# Patient Record
Sex: FEMALE | URBAN - METROPOLITAN AREA
[De-identification: names, ages, dates, MRNs, and addresses within clinical notes are randomized per-mention and may not be internally consistent; named-entity substitution may affect disease eponyms.]

---

## 2017-04-20 ENCOUNTER — APPOINTMENT (RX ONLY)
Age: 32
Setting detail: DERMATOLOGY
End: 2017-04-20

## 2017-04-20 DIAGNOSIS — Z80.8 FAMILY HISTORY OF MALIGNANT NEOPLASM OF OTHER ORGANS OR SYSTEMS: ICD-10-CM | Status: STABLE

## 2017-04-20 DIAGNOSIS — Z85.828 PERSONAL HISTORY OF OTHER MALIGNANT NEOPLASM OF SKIN: ICD-10-CM | Status: RESOLVED

## 2017-04-20 DIAGNOSIS — L82.1 OTHER SEBORRHEIC KERATOSIS: ICD-10-CM | Status: STABLE

## 2017-04-20 DIAGNOSIS — D22 MELANOCYTIC NEVI: ICD-10-CM | Status: INADEQUATELY CONTROLLED

## 2017-04-20 PROBLEM — D22.5 MELANOCYTIC NEVI OF TRUNK: Status: ACTIVE | Noted: 2017-04-20

## 2017-04-20 PROBLEM — D48.5 NEOPLASM OF UNCERTAIN BEHAVIOR OF SKIN: Status: ACTIVE | Noted: 2017-04-20

## 2017-04-20 PROCEDURE — ? COUNSELING

## 2017-04-20 PROCEDURE — 11100: CPT | Mod: 59

## 2017-04-20 PROCEDURE — ? SHAVE REMOVAL

## 2017-04-20 PROCEDURE — 11300 SHAVE SKIN LESION 0.5 CM/<: CPT

## 2017-04-20 PROCEDURE — ? BIOPSY BY SHAVE METHOD

## 2017-04-20 PROCEDURE — 99214 OFFICE O/P EST MOD 30 MIN: CPT | Mod: 25

## 2017-04-20 ASSESSMENT — LOCATION SIMPLE DESCRIPTION DERM
LOCATION SIMPLE: LEFT CHEEK
LOCATION SIMPLE: LEFT UPPER BACK
LOCATION SIMPLE: RIGHT BREAST
LOCATION SIMPLE: CHEST
LOCATION SIMPLE: RIGHT UPPER BACK

## 2017-04-20 ASSESSMENT — LOCATION ZONE DERM
LOCATION ZONE: FACE
LOCATION ZONE: TRUNK

## 2017-04-20 ASSESSMENT — LOCATION DETAILED DESCRIPTION DERM
LOCATION DETAILED: RIGHT MEDIAL BREAST 2-3:00 REGION
LOCATION DETAILED: LEFT MEDIAL UPPER BACK
LOCATION DETAILED: LEFT INFERIOR CENTRAL MALAR CHEEK
LOCATION DETAILED: LEFT MEDIAL SUPERIOR CHEST
LOCATION DETAILED: RIGHT SUPERIOR MEDIAL UPPER BACK

## 2017-04-20 NOTE — PROCEDURE: SHAVE REMOVAL
Bill For Surgical Tray: no
X Size Of Lesion In Cm (Optional): 0.6
Anesthesia Volume In Cc: 0.3
Size Of Lesion In Cm (Required): -
Notification Instructions: Patient will be notified of biopsy results. However, patient instructed to call the office if not contacted within 2 weeks.
Anesthesia Type: 1% lidocaine without epinephrine
Medical Necessity Information: It is in your best interest to select a reason for this procedure from the list below. All of these items fulfill various CMS LCD requirements except the new and changing color options.
Billing Type: Third-Party Bill
Detail Level: Detailed
Post-Care Instructions: I reviewed with the patient in detail post-care instructions. Patient is to keep the biopsy site dry overnight, and then apply bacitracin twice daily until healed. Patient may apply hydrogen peroxide soaks to remove any crusting.
Wound Care: Aquaphor
Biopsy Method: Dermablade
Hemostasis: Electrocautery
Medical Necessity Clause: This procedure was medically necessary because the lesion that was treated was:
Consent was obtained from the patient. The risks and benefits to therapy were discussed in detail. Specifically, the risks of infection, scarring, bleeding, prolonged wound healing, incomplete removal, allergy to anesthesia, nerve injury and recurrence were addressed. Prior to the procedure, the treatment site was clearly identified and confirmed by the patient. All components of Universal Protocol/PAUSE Rule completed.

## 2017-04-20 NOTE — PROCEDURE: BIOPSY BY SHAVE METHOD
Consent: Verbal consent was obtained and risks were reviewed including but not limited to scarring, infection, bleeding, scabbing, incomplete removal, nerve damage and allergy to anesthesia.
Biopsy Type: H and E
Anesthesia Volume In Cc: 0.5
Notification Instructions: Patient will be notified of biopsy results. However, patient instructed to call the office if not contacted within 2 weeks.
X Size Of Lesion In Cm: 0
Bill 17764 For Specimen Handling/Conveyance To Laboratory?: no
Size Of Lesion In Cm: 0.6
Detail Level: Detailed
Dressing: bandage
Billing Type: Third-Party Bill
Curettage Text: The wound bed was treated with curettage after the biopsy was performed.
Anesthesia Type: 1% lidocaine without epinephrine
Post-Care Instructions: I reviewed with the patient in detail post-care instructions. Patient is to keep the biopsy site dry overnight, and then apply bacitracin twice daily until healed. Patient may apply hydrogen peroxide soaks to remove any crusting.
Hemostasis: Electrocautery
Biopsy Method: Dermablade
Wound Care: Aquaphor
Electrodesiccation And Curettage Text: The wound bed was treated with electrodesiccation and curettage after the biopsy was performed.
Cryotherapy Text: The wound bed was treated with cryotherapy after the biopsy was performed.
Type Of Destruction Used: Electrodesiccation and Curettage
Silver Nitrate Text: The wound bed was treated with silver nitrate after the biopsy was performed.
Electrodesiccation Text: The wound bed was treated with electrodesiccation after the biopsy was performed.

## 2019-12-09 NOTE — HPI: FULL BODY SKIN EXAMINATION
Spoke with pt's daughter and discussed notes per Dr. Garcia. She gives verbal understanding and will change medication dosage of Metformin from 1000mg-AM/500mg -PM to 1000mg-AM/1000-PM. Understands that refill sent in has incorrect directions. No other questions or concerns at this time.   
What Is The Reason For Today's Visit?: Full Body Skin Examination
What Is The Reason For Today's Visit? (Being Monitored For X): the development of new lesions
How Severe Are Your Spot(S)?: mild